# Patient Record
Sex: MALE | Race: BLACK OR AFRICAN AMERICAN | NOT HISPANIC OR LATINO | Employment: FULL TIME | URBAN - METROPOLITAN AREA
[De-identification: names, ages, dates, MRNs, and addresses within clinical notes are randomized per-mention and may not be internally consistent; named-entity substitution may affect disease eponyms.]

---

## 2021-07-05 ENCOUNTER — ALLIED HEALTH/NURSE VISIT (OUTPATIENT)
Dept: NURSING | Facility: CLINIC | Age: 21
End: 2021-07-05

## 2021-07-05 DIAGNOSIS — R21 RASH: Primary | ICD-10-CM

## 2021-07-05 PROCEDURE — 99207 PR NO CHARGE NURSE ONLY: CPT

## 2021-07-05 NOTE — NURSING NOTE
Pt walks in the clinic.  He got a tetnaus shot at [facility] in [place] on Thursday.      He thinks he has hives.  He says he will start itching and his skin swells up.  Not seeing any hives at the moment.  He has some photos, but they are a little difficult to see.  He said this started happening the day after the shot.  They gave him a cream for a second degree burn.  He stopped using the cream for a short while and he still had hives.  He started using the cream again.    No problems with breathing.  He says he felt a little shaky since the tetanaus shot, but that has gotten better.      He denies trying anything else new that could possibly cause a reaction.      Advised he could try some benadryl per directions on the package.  He does not work today.  Advised to not drive or do anything important when taking benadryl as it will make you drowsy.  Advised to call [facility] where he received the vaccine for their advisal and to keep this in mind in case they think it was an allergy from the tetnaus for future vaccine.  Pt to be seen emergently if any problems with his breathing.

## 2023-07-17 ENCOUNTER — HOSPITAL ENCOUNTER (EMERGENCY)
Facility: CLINIC | Age: 23
End: 2023-07-17

## 2023-07-17 ENCOUNTER — HOSPITAL ENCOUNTER (EMERGENCY)
Facility: CLINIC | Age: 23
Discharge: HOME | End: 2023-07-17
Attending: EMERGENCY MEDICINE | Admitting: EMERGENCY MEDICINE

## 2023-07-17 VITALS
RESPIRATION RATE: 18 BRPM | WEIGHT: 304.01 LBS | TEMPERATURE: 97.8 F | SYSTOLIC BLOOD PRESSURE: 128 MMHG | HEART RATE: 87 BPM | OXYGEN SATURATION: 97 % | DIASTOLIC BLOOD PRESSURE: 79 MMHG

## 2023-07-17 VITALS
RESPIRATION RATE: 18 BRPM | WEIGHT: 304.01 LBS | HEART RATE: 92 BPM | TEMPERATURE: 97.8 F | OXYGEN SATURATION: 97 % | SYSTOLIC BLOOD PRESSURE: 148 MMHG | DIASTOLIC BLOOD PRESSURE: 92 MMHG

## 2023-07-17 DIAGNOSIS — K62.5 BRIGHT RED BLOOD PER RECTUM: ICD-10-CM

## 2023-07-17 DIAGNOSIS — R19.7 DIARRHEA, UNSPECIFIED TYPE: ICD-10-CM

## 2023-07-17 PROCEDURE — 99282 EMERGENCY DEPT VISIT SF MDM: CPT

## 2023-07-17 ASSESSMENT — ACTIVITIES OF DAILY LIVING (ADL): ADLS_ACUITY_SCORE: 33

## 2023-07-17 NOTE — Clinical Note
Naveed Mccrary was seen and treated in our emergency department on 7/17/2023.  He may return to work on 07/19/2023.       If you have any questions or concerns, please don't hesitate to call.      Rudy Cabrera MD

## 2023-07-18 NOTE — ED NOTES
PIT/Triage Evaluation    Patient presented with diarrhea. Onset this AM. Occurs after eating. Watery. Traces blood in stool. Associated rectal pain. NO vomiting or nausea. NO fever. No abdominal pain. Pt unsure of bad food or not. No recent travel. No pmhx or regular medication.     Exam is notable for:  General:  Alert, interactive  Cardiovascular:  Well perfused  Lungs:  No respiratory distress, no accessory muscle use  Neuro:  Moving all 4 extremities  Skin:  Warm, dry  Psych:  Normal affect    Appropriate interventions for symptom management were initiated if applicable.  Appropriate diagnostic tests were initiated if indicated.    Important information for subsequent clinician:    I briefly evaluated the patient and developed an initial plan of care. I discussed this plan and explained that this brief interaction does not constitute a full evaluation. Patient/family understands that they should wait to be fully evaluated and discuss any test results with another clinician prior to leaving the hospital.       Rudy Cabrera MD  07/17/23 9487

## 2023-07-18 NOTE — ED TRIAGE NOTES
Triage Assessment     Row Name 07/17/23 1958       Triage Assessment (Adult)    Airway WDL WDL       Respiratory WDL    Respiratory WDL WDL       Cardiac WDL    Cardiac WDL WDL

## 2023-07-18 NOTE — ED PROVIDER NOTES
History     Chief Complaint:  Diarrhea       HPI   Naveed Mccrary is a 23 year old male who presented with diarrhea. Onset this AM. Occurs after eating. Watery. Traces blood in stool. Associated rectal pain. No vomiting or nausea. NO fever. No abdominal pain. Pt unsure of bad food or not before onset of sx. No recent travel. No pmhx or regular medication.      Medications:    No current outpatient medications on file.      Past Medical History:    No past medical history on file.    Past Surgical History:    No past surgical history on file.     Physical Exam     Patient Vitals for the past 24 hrs:   BP Temp Temp src Pulse Resp SpO2 Weight   07/17/23 2114 128/79 -- -- 87 -- 97 % --   07/17/23 2004 (!) 148/92 97.8  F (36.6  C) Temporal 92 18 97 % 137.9 kg (304 lb 0.2 oz)        Physical Exam  VS: Reviewed per above  HENT: normal speech  EYES: sclera anicteric  CV: Rate as noted, regular rhythm.   RESP: Effort normal. Breath sounds are normal bilaterally.  GI: no tenderness/rebound/guarding, not distended.  NEURO: Alert, moving all extremities  MSK: No deformity of the extremities  SKIN: Warm and dry    Emergency Department Course     Emergency Department Course & Assessments:               Disposition:  The patient was discharged to home.     Impression & Plan      Medical Decision Making:  Presents to the ER for evaluation of 1 day of diarrhea as well as traces of blood in the stool and rectal discomfort.  Vital signs reassuring.  Abdominal exam is benign.  Doubt sinister or surgical intra-abdominal process.  Based on history and exam/clinical data, low suspicion for concerning electrolyte derangement or profound dehydration or significant blood loss/anemia.  Considered hemorrhoidal bleed versus anal fissure versus colitis with associated mild bleeding.  Lower suspicion for brisk GI bleed.  Lower suspicion for invasive bacterial diarrhea.  Patient is tolerating p.o.'s and encouraged lots of fluid intake.  Plan  for symptom control with Pepto-Bismol versus Imodium, Preparation H with lidocaine, follow-up in primary care.  Return precautions discussed prior to discharge.    Diagnosis:    ICD-10-CM    1. Diarrhea, unspecified type  R19.7       2. Bright red blood per rectum  K62.5            Discharge Medications:  There are no discharge medications for this patient.            Rudy Cabrera MD  07/17/23 5478

## 2023-07-18 NOTE — DISCHARGE INSTRUCTIONS
"Return for fevers, worsening pain, worsening bleeding. You can take immodium or pepto-bismol pills (generic versions are ok). You can also use preparation h or other hemorrhoid cream with \"lidocaine\" for rectal discomfort.  "

## 2023-07-18 NOTE — ED TRIAGE NOTES
Pt arrives with diarrhea since this morning, pt states he is unsure if he had bad food yesterday. States pain to the rectum and noticing spots of bright red blood after having a Bm. ABCs intact and AOx4.      Triage Assessment     Row Name 07/17/23 2005       Triage Assessment (Adult)    Airway WDL WDL       Cardiac WDL    Cardiac WDL WDL

## 2023-08-19 ENCOUNTER — HOSPITAL ENCOUNTER (EMERGENCY)
Facility: CLINIC | Age: 23
Discharge: HOME | End: 2023-08-20
Attending: EMERGENCY MEDICINE | Admitting: EMERGENCY MEDICINE

## 2023-08-19 DIAGNOSIS — R11.2 NAUSEA VOMITING AND DIARRHEA: ICD-10-CM

## 2023-08-19 DIAGNOSIS — R19.7 NAUSEA VOMITING AND DIARRHEA: ICD-10-CM

## 2023-08-19 LAB
ALBUMIN SERPL-MCNC: 4.4 G/DL (ref 3.5–5)
ALBUMIN UR-MCNC: 50 MG/DL
ALP SERPL-CCNC: 58 U/L (ref 45–120)
ALT SERPL W P-5'-P-CCNC: 40 U/L (ref 0–45)
ANION GAP SERPL CALCULATED.3IONS-SCNC: 9 MMOL/L (ref 5–18)
APPEARANCE UR: CLEAR
AST SERPL W P-5'-P-CCNC: 26 U/L (ref 0–40)
BACTERIA #/AREA URNS HPF: ABNORMAL /HPF
BASOPHILS # BLD AUTO: 0 10E3/UL (ref 0–0.2)
BASOPHILS NFR BLD AUTO: 0 %
BILIRUB DIRECT SERPL-MCNC: 0.3 MG/DL
BILIRUB SERPL-MCNC: 0.8 MG/DL (ref 0–1)
BILIRUB UR QL STRIP: NEGATIVE
BUN SERPL-MCNC: 15 MG/DL (ref 8–22)
CALCIUM SERPL-MCNC: 9.1 MG/DL (ref 8.5–10.5)
CHLORIDE BLD-SCNC: 102 MMOL/L (ref 98–107)
CO2 SERPL-SCNC: 29 MMOL/L (ref 22–31)
COLOR UR AUTO: YELLOW
CREAT SERPL-MCNC: 1.06 MG/DL (ref 0.7–1.3)
EOSINOPHIL # BLD AUTO: 0.1 10E3/UL (ref 0–0.7)
EOSINOPHIL NFR BLD AUTO: 3 %
ERYTHROCYTE [DISTWIDTH] IN BLOOD BY AUTOMATED COUNT: 12.2 % (ref 10–15)
GFR SERPL CREATININE-BSD FRML MDRD: >90 ML/MIN/1.73M2
GLUCOSE BLD-MCNC: 92 MG/DL (ref 70–125)
GLUCOSE UR STRIP-MCNC: NEGATIVE MG/DL
HCT VFR BLD AUTO: 45.7 % (ref 40–53)
HGB BLD-MCNC: 15.5 G/DL (ref 13.3–17.7)
HGB UR QL STRIP: NEGATIVE
IMM GRANULOCYTES # BLD: 0 10E3/UL
IMM GRANULOCYTES NFR BLD: 0 %
KETONES UR STRIP-MCNC: NEGATIVE MG/DL
LEUKOCYTE ESTERASE UR QL STRIP: NEGATIVE
LIPASE SERPL-CCNC: 14 U/L (ref 0–52)
LYMPHOCYTES # BLD AUTO: 1.3 10E3/UL (ref 0.8–5.3)
LYMPHOCYTES NFR BLD AUTO: 22 %
MCH RBC QN AUTO: 30.6 PG (ref 26.5–33)
MCHC RBC AUTO-ENTMCNC: 33.9 G/DL (ref 31.5–36.5)
MCV RBC AUTO: 90 FL (ref 78–100)
MONOCYTES # BLD AUTO: 0.5 10E3/UL (ref 0–1.3)
MONOCYTES NFR BLD AUTO: 8 %
MUCOUS THREADS #/AREA URNS LPF: PRESENT /LPF
NEUTROPHILS # BLD AUTO: 3.8 10E3/UL (ref 1.6–8.3)
NEUTROPHILS NFR BLD AUTO: 67 %
NITRATE UR QL: NEGATIVE
NRBC # BLD AUTO: 0 10E3/UL
NRBC BLD AUTO-RTO: 0 /100
PH UR STRIP: 6 [PH] (ref 5–7)
PLATELET # BLD AUTO: 273 10E3/UL (ref 150–450)
POTASSIUM BLD-SCNC: 3.6 MMOL/L (ref 3.5–5)
PROT SERPL-MCNC: 7.9 G/DL (ref 6–8)
RBC # BLD AUTO: 5.07 10E6/UL (ref 4.4–5.9)
RBC URINE: 1 /HPF
SODIUM SERPL-SCNC: 140 MMOL/L (ref 136–145)
SP GR UR STRIP: >1.03 (ref 1–1.03)
SQUAMOUS EPITHELIAL: 1 /HPF
UROBILINOGEN UR STRIP-MCNC: 2 MG/DL
WBC # BLD AUTO: 5.7 10E3/UL (ref 4–11)
WBC URINE: 2 /HPF

## 2023-08-19 PROCEDURE — 96374 THER/PROPH/DIAG INJ IV PUSH: CPT

## 2023-08-19 PROCEDURE — 83690 ASSAY OF LIPASE: CPT | Performed by: EMERGENCY MEDICINE

## 2023-08-19 PROCEDURE — 36415 COLL VENOUS BLD VENIPUNCTURE: CPT | Performed by: EMERGENCY MEDICINE

## 2023-08-19 PROCEDURE — 96361 HYDRATE IV INFUSION ADD-ON: CPT

## 2023-08-19 PROCEDURE — 250N000011 HC RX IP 250 OP 636: Mod: JZ | Performed by: EMERGENCY MEDICINE

## 2023-08-19 PROCEDURE — 81001 URINALYSIS AUTO W/SCOPE: CPT | Performed by: EMERGENCY MEDICINE

## 2023-08-19 PROCEDURE — 99284 EMERGENCY DEPT VISIT MOD MDM: CPT | Mod: 25

## 2023-08-19 PROCEDURE — 258N000003 HC RX IP 258 OP 636: Performed by: EMERGENCY MEDICINE

## 2023-08-19 PROCEDURE — 82248 BILIRUBIN DIRECT: CPT | Performed by: EMERGENCY MEDICINE

## 2023-08-19 PROCEDURE — 85025 COMPLETE CBC W/AUTO DIFF WBC: CPT | Performed by: EMERGENCY MEDICINE

## 2023-08-19 RX ORDER — ONDANSETRON 2 MG/ML
4 INJECTION INTRAMUSCULAR; INTRAVENOUS ONCE
Status: COMPLETED | OUTPATIENT
Start: 2023-08-19 | End: 2023-08-19

## 2023-08-19 RX ORDER — ONDANSETRON 4 MG/1
4 TABLET, ORALLY DISINTEGRATING ORAL EVERY 8 HOURS PRN
Qty: 10 TABLET | Refills: 0 | Status: SHIPPED | OUTPATIENT
Start: 2023-08-19

## 2023-08-19 RX ADMIN — ONDANSETRON 4 MG: 2 INJECTION INTRAMUSCULAR; INTRAVENOUS at 23:00

## 2023-08-19 RX ADMIN — SODIUM CHLORIDE 1000 ML: 9 INJECTION, SOLUTION INTRAVENOUS at 22:56

## 2023-08-19 ASSESSMENT — ENCOUNTER SYMPTOMS
ABDOMINAL PAIN: 0
COUGH: 0
SHORTNESS OF BREATH: 0
NAUSEA: 1
VOMITING: 1
FEVER: 0
DIARRHEA: 1
DYSURIA: 0

## 2023-08-19 ASSESSMENT — ACTIVITIES OF DAILY LIVING (ADL): ADLS_ACUITY_SCORE: 35

## 2023-08-19 NOTE — Clinical Note
Naveed Mccrary was seen and treated in our emergency department on 8/19/2023.  He may return to work on 08/21/2023.       If you have any questions or concerns, please don't hesitate to call.      Cora Orellana MD

## 2023-08-20 VITALS
SYSTOLIC BLOOD PRESSURE: 117 MMHG | BODY MASS INDEX: 38.5 KG/M2 | DIASTOLIC BLOOD PRESSURE: 74 MMHG | OXYGEN SATURATION: 100 % | HEIGHT: 74 IN | RESPIRATION RATE: 18 BRPM | TEMPERATURE: 97.7 F | WEIGHT: 300 LBS | HEART RATE: 109 BPM

## 2023-08-20 NOTE — ED PROVIDER NOTES
EMERGENCY DEPARTMENT ENCOUNTER      NAME: Naveed Mccrary  AGE: 23 year old male  YOB: 2000  MRN: [id number]  EVALUATION DATE & TIME: 2023 10:31 PM    PCP: No Ref-Primary, Physician    ED PROVIDER: Cora Orellana M.D.        Chief Complaint   Patient presents with    Nausea, Vomiting, & Diarrhea         FINAL IMPRESSION:    1. Nausea vomiting and diarrhea            MEDICAL DECISION MAKIN year old male who is reportedly a man presents emergency department episodes of vomiting and diarrhea.  Patient looks fantastic.  Abdomen soft and benign.  Laboratories reassuring.  I do not have concerns for any surgical process.  More likely a viral gastroenteritis type picture.  I do not think he requires emergent stool studies, radiology imaging, or further laboratory evaluation.  Patient given IV fluids and Zofran and feels much better.  At this time I feel he is safe for discharge home.  Prescription for Zofran provided for home use.      ED COURSE:  10:38 PM  I met with the patient to gather history and perform my exam. ED course and treatment discussed.    11:38 PM  Updated patient on all results.  He looks fantastic.  I do not have concerns for surgical process or need to do CT imaging of the abdomen other radiology tests.  Do not think he requires any other laboratory evaluation.  Patient agrees with the plan for discharge and all of his questions have been answered.    I do not think that this represents ACS, PE, ruptured AAA, aortic dissection, bowel obstruction, bowel ischemia, cholecystitis, pancreatitis, appendicitis, diverticulitis, kidney stone, pyelonephritis, incarcerated or strangulated hernia, testicular torsion, viscus perforation, perforated GI ulcer, or other such etiologies at this time.      At the conclusion of the encounter I discussed the results of all of the tests and the disposition. Their questions were answered. The patient (and any family present) acknowledged  "understanding and were agreeable with the care plan.        CONSULTANTS:  none        MEDICATIONS GIVEN IN THE EMERGENCY:  Medications   ondansetron (ZOFRAN) injection 4 mg (4 mg Intravenous $Given 8/19/23 6820)   0.9% sodium chloride BOLUS (1,000 mLs Intravenous $New Bag 8/19/23 9902)           NEW PRESCRIPTIONS STARTED AT TODAY'S ER VISIT     Medication List        Started      ondansetron 4 MG ODT tab  Commonly known as: ZOFRAN ODT  4 mg, Oral, EVERY 8 HOURS PRN                  CONDITION:  stable        DISPOSITION:  Discharge home         =================================================================  =================================================================  TRIAGE ASSESSMENT:  Pt presents to ED with c/o N/V/D starting around 0430 this morning.  Denies fevers or chills.        ED Triage Vitals [08/19/23 2213]   Enc Vitals Group      /77      Pulse 109      Resp 18      Temp 97.7  F (36.5  C)      Temp src Oral      SpO2 98 %      Weight 136.1 kg (300 lb)      Height 1.88 m (6' 2\")          ================================================================  ================================================================    HPI    Patient information was obtained from: patient    Use of SYLOBepreter: N/A     Naveed BOB Mccrary is a 23 year old male who is reportedly otherwise healthy and presents emergency department with vomiting and diarrhea.  Symptoms began early this morning around 4:30 AM.  He suspects that he has a viral stomach bug.  He reports 8 episodes of emesis and 3 episodes of diarrhea today.  Otherwise denies any fevers, cough, chest pain, shortness of breath, abdominal pain.        REVIEW OF SYSTEMS  Review of Systems   Constitutional:  Negative for fever.   Respiratory:  Negative for cough and shortness of breath.    Cardiovascular:  Negative for chest pain.   Gastrointestinal:  Positive for diarrhea, nausea and vomiting. Negative for abdominal pain.   Genitourinary:  Negative for " "dysuria.   All other systems reviewed and are negative.        PAST MEDICAL HISTORY:  History reviewed. No pertinent past medical history.      PAST SURGICAL HISTORY:  History reviewed. No pertinent surgical history.      CURRENT MEDICATIONS:    Prior to Admission medications    Not on File         ALLERGIES:  No Known Allergies      FAMILY HISTORY:  History reviewed. No pertinent family history.      SOCIAL HISTORY:  Social History     Socioeconomic History    Marital status: Single         VITALS:  Patient Vitals for the past 24 hrs:   BP Temp Temp src Pulse Resp SpO2 Height Weight   08/19/23 2213 126/77 97.7  F (36.5  C) Oral 109 18 98 % 1.88 m (6' 2\") 136.1 kg (300 lb)       Wt Readings from Last 3 Encounters:   08/19/23 136.1 kg (300 lb)   07/17/23 137.9 kg (304 lb 0.2 oz)   07/17/23 137.9 kg (304 lb 0.2 oz)       Estimated Creatinine Clearance: 157.8 mL/min (based on SCr of 1.06 mg/dL).    PHYSICAL EXAM    Constitutional:  Well developed, Well nourished, NAD, GCS 15  HENT:  Normocephalic, Atraumatic, Bilateral external ears normal, Nose normal. Neck- Supple, No stridor.   Eyes:  PERRL, EOMI, Conjunctiva normal, No discharge.  Respiratory:  Normal breath sounds, No respiratory distress, No wheezing, Speaks full sentences easily. No cough.   Cardiovascular:  Normal heart rate, Regular rhythm, No rubs, No gallops.   GI:  No excessive obesity.  Bowel sounds normal, Soft, No tenderness, No masses, No flank tenderness. No rebound or guarding.   : deferred  Musculoskeletal:  No cyanosis, No clubbing. Good range of motion in all major joints. No major deformities noted.  Integument:  Warm, Dry, No erythema, No rash.  No petechiae.   Neurologic:  Alert & oriented x 3  Psychiatric:  Affect normal, Cooperative         LAB:  All pertinent labs reviewed and interpreted.  Recent Results (from the past 24 hour(s))   UA with Microscopic reflex to Culture    Collection Time: 08/19/23 10:49 PM    Specimen: Urine, Clean Catch "   Result Value Ref Range    Color Urine Yellow Colorless, Straw, Light Yellow, Yellow    Appearance Urine Clear Clear    Glucose Urine Negative Negative mg/dL    Bilirubin Urine Negative Negative    Ketones Urine Negative Negative mg/dL    Specific Gravity Urine >1.030 (H) 1.005 - 1.030    Blood Urine Negative Negative    pH Urine 6.0 5.0 - 7.0    Protein Albumin Urine 50 (A) Negative mg/dL    Urobilinogen Urine 2.0 (A) <2.0 mg/dL    Nitrite Urine Negative Negative    Leukocyte Esterase Urine Negative Negative    Bacteria Urine Few (A) None Seen /HPF    Mucus Urine Present (A) None Seen /LPF    RBC Urine 1 <=2 /HPF    WBC Urine 2 <=5 /HPF    Squamous Epithelials Urine 1 <=1 /HPF   Basic metabolic panel    Collection Time: 08/19/23 10:55 PM   Result Value Ref Range    Sodium 140 136 - 145 mmol/L    Potassium 3.6 3.5 - 5.0 mmol/L    Chloride 102 98 - 107 mmol/L    Carbon Dioxide (CO2) 29 22 - 31 mmol/L    Anion Gap 9 5 - 18 mmol/L    Urea Nitrogen 15 8 - 22 mg/dL    Creatinine 1.06 0.70 - 1.30 mg/dL    Calcium 9.1 8.5 - 10.5 mg/dL    Glucose 92 70 - 125 mg/dL    GFR Estimate >90 >60 mL/min/1.73m2   Hepatic function panel    Collection Time: 08/19/23 10:55 PM   Result Value Ref Range    Bilirubin Total 0.8 0.0 - 1.0 mg/dL    Bilirubin Direct 0.3 <=0.5 mg/dL    Protein Total 7.9 6.0 - 8.0 g/dL    Albumin 4.4 3.5 - 5.0 g/dL    Alkaline Phosphatase 58 45 - 120 U/L    AST 26 0 - 40 U/L    ALT 40 0 - 45 U/L   Lipase    Collection Time: 08/19/23 10:55 PM   Result Value Ref Range    Lipase 14 0 - 52 U/L   CBC with platelets and differential    Collection Time: 08/19/23 10:55 PM   Result Value Ref Range    WBC Count 5.7 4.0 - 11.0 10e3/uL    RBC Count 5.07 4.40 - 5.90 10e6/uL    Hemoglobin 15.5 13.3 - 17.7 g/dL    Hematocrit 45.7 40.0 - 53.0 %    MCV 90 78 - 100 fL    MCH 30.6 26.5 - 33.0 pg    MCHC 33.9 31.5 - 36.5 g/dL    RDW 12.2 10.0 - 15.0 %    Platelet Count 273 150 - 450 10e3/uL    % Neutrophils 67 %    % Lymphocytes 22  %    % Monocytes 8 %    % Eosinophils 3 %    % Basophils 0 %    % Immature Granulocytes 0 %    NRBCs per 100 WBC 0 <1 /100    Absolute Neutrophils 3.8 1.6 - 8.3 10e3/uL    Absolute Lymphocytes 1.3 0.8 - 5.3 10e3/uL    Absolute Monocytes 0.5 0.0 - 1.3 10e3/uL    Absolute Eosinophils 0.1 0.0 - 0.7 10e3/uL    Absolute Basophils 0.0 0.0 - 0.2 10e3/uL    Absolute Immature Granulocytes 0.0 <=0.4 10e3/uL    Absolute NRBCs 0.0 10e3/uL       [facility] found [facility]: [facility]        RADIOLOGY:  Reviewed all pertinent imaging. Please see official radiology report.    No orders to display         EKG:    none      PROCEDURES:  none    Medical Decision Making    History:  Supplemental history from: Documented in chart, if applicable  External Record(s) reviewed: Documented in chart, if applicable.    Work Up:  Chart documentation includes differential considered and any EKGs or imaging independently interpreted by provider, where specified.  In additional to work up documented, I considered the following work up: Documented in chart, if applicable.    External consultation:  Discussion of management with another provider: Documented in chart, if applicable    Complicating factors:  Care impacted by chronic illness: Other: pt denies  Care affected by social determinants of health: N/A    Disposition considerations: Discharge. I prescribed additional prescription strength medication(s) as charted. N/A.        Cora Orellana M.D. [facility]  Emergency Medicine and Medical Toxicology  [facility] EMERGENCY ROOM  [address] [id number] [phone]  Dept: [phone]           Cora Orellana MD  08/19/23 6799

## 2023-08-20 NOTE — DISCHARGE INSTRUCTIONS
All of your blood test look good.  It is certainly possible that you have a stomach virus.  Usually these resolve after 24-48 hours.  You can use the ondansetron nausea medication as directed to help control any nausea and vomiting.    You can drink sips of liquids such as Gatorade to help stay hydrated.     Return the emergency department with worsening vomiting, worsening diarrhea, concerns for dehydration, persistent abdominal pain, bloody stools, or any other concerns.    Thank you for choosing [facility] Emergency Department.  It has been my pleasure caring for you today.     ~Dr. Reyna MD

## 2023-08-20 NOTE — ED TRIAGE NOTES
Pt presents to ED with c/o N/V/D starting around 0430 this morning.  Denies fevers or chills.     Triage Assessment       Row Name 08/19/23 1273       Triage Assessment (Adult)    Airway WDL WDL       Respiratory WDL    Respiratory WDL WDL       Skin Circulation/Temperature WDL    Skin Circulation/Temperature WDL WDL       Cardiac WDL    Cardiac WDL WDL       Peripheral/Neurovascular WDL    Peripheral Neurovascular WDL WDL       Cognitive/Neuro/Behavioral WDL    Cognitive/Neuro/Behavioral WDL WDL